# Patient Record
Sex: MALE | Race: WHITE | NOT HISPANIC OR LATINO | Employment: FULL TIME | URBAN - METROPOLITAN AREA
[De-identification: names, ages, dates, MRNs, and addresses within clinical notes are randomized per-mention and may not be internally consistent; named-entity substitution may affect disease eponyms.]

---

## 2019-02-21 ENCOUNTER — OFFICE VISIT (OUTPATIENT)
Dept: FAMILY MEDICINE | Facility: CLINIC | Age: 24
End: 2019-02-21
Payer: COMMERCIAL

## 2019-02-21 VITALS
HEIGHT: 70 IN | BODY MASS INDEX: 20.62 KG/M2 | DIASTOLIC BLOOD PRESSURE: 60 MMHG | HEART RATE: 78 BPM | WEIGHT: 144 LBS | OXYGEN SATURATION: 98 % | SYSTOLIC BLOOD PRESSURE: 110 MMHG

## 2019-02-21 DIAGNOSIS — R63.0 DECREASED APPETITE: Primary | ICD-10-CM

## 2019-02-21 DIAGNOSIS — Z00.00 HEALTHCARE MAINTENANCE: ICD-10-CM

## 2019-02-21 DIAGNOSIS — R63.4 WEIGHT LOSS: ICD-10-CM

## 2019-02-21 PROBLEM — Z78.9 NON-SMOKER: Status: ACTIVE | Noted: 2019-02-21

## 2019-02-21 PROBLEM — G25.3 MYOCLONUS: Status: ACTIVE | Noted: 2019-02-21

## 2019-02-21 PROBLEM — G40.309 GENERALIZED CONVULSIVE EPILEPSY: Status: ACTIVE | Noted: 2019-02-21

## 2019-02-21 PROBLEM — F95.2 GILLES DE LA TOURETTE'S SYNDROME: Status: ACTIVE | Noted: 2019-02-21

## 2019-02-21 PROCEDURE — 99214 OFFICE O/P EST MOD 30 MIN: CPT | Mod: ,,, | Performed by: NURSE PRACTITIONER

## 2019-02-21 PROCEDURE — 99214 PR OFFICE/OUTPT VISIT, EST, LEVL IV, 30-39 MIN: ICD-10-PCS | Mod: ,,, | Performed by: NURSE PRACTITIONER

## 2019-02-21 PROCEDURE — 3008F BODY MASS INDEX DOCD: CPT | Mod: ,,, | Performed by: NURSE PRACTITIONER

## 2019-02-21 PROCEDURE — 3008F PR BODY MASS INDEX (BMI) DOCUMENTED: ICD-10-PCS | Mod: ,,, | Performed by: NURSE PRACTITIONER

## 2019-02-21 RX ORDER — LAMOTRIGINE 200 MG/1
1 TABLET ORAL 2 TIMES DAILY
COMMUNITY

## 2019-02-21 RX ORDER — CYPROHEPTADINE HYDROCHLORIDE 4 MG/1
4 TABLET ORAL 2 TIMES DAILY PRN
Qty: 60 TABLET | Refills: 2 | Status: SHIPPED | OUTPATIENT
Start: 2019-02-21 | End: 2019-05-02

## 2019-02-21 NOTE — PATIENT INSTRUCTIONS
"  Understanding Body Mass Index (BMI)  Body mass index (BMI) is a method of screening for a weight category using the ratio of your height to your weight. The BMI is a measure of overweight that is corrected for height. Knowing your BMI is a way to tell if you are at a healthy weight. The higher your BMI, the greater your risk for weight-related health problems.  What BMI means  · BMI below 18.5: Underweight  · BMI 18.5 to 24.9: Healthy weight or "ideal body weight"   · BMI 25 to 29.9: Overweight  · BMI 30 and over: Obese  · BMI 40 and over: Severe obesity   Online BMI Calculators  Find your BMI with an online BMI calculator tool, such as these from the CDC:  · BMI calculator for adults  · BMI calculator for children and teens   Using the BMI chart  To figure out your BMI, find your height and weight (or the numbers closest to them) on the table below. Follow each column of numbers to where your height and weight meet on the table. That is your BMI.    Date Last Reviewed: 7/1/2016  © 6579-4875 [employer]. [address] [place] [address], [place] [address]. All rights reserved. This information is not intended as a substitute for professional medical care. Always follow your healthcare professional's instructions.        "

## 2019-02-21 NOTE — PROGRESS NOTES
SUBJECTIVE:      Patient ID: Sky Barrientos is a 23 y.o. male.    Chief Complaint: Weight Loss    Sky is here today with c/o of about a 20 lb weight loss over many months.  He states that he feels fine, he just does not have an appetite.  He states he usually only eats twice a day.  He usually does not eat breakfast and does not snack between meals. He has lost 18 lbs since 2016.  His weight is overall stable since 2012.        History reviewed. No pertinent surgical history.  Family History   Problem Relation Age of Onset    Heart disease Father       Social History     Socioeconomic History    Marital status: Single     Spouse name: None    Number of children: None    Years of education: None    Highest education level: None   Social Needs    Financial resource strain: None    Food insecurity - worry: None    Food insecurity - inability: None    Transportation needs - medical: None    Transportation needs - non-medical: None   Occupational History    Occupation: [employer]    Tobacco Use    Smoking status: Never Smoker    Smokeless tobacco: Never Used   Substance and Sexual Activity    Alcohol use: No    Drug use: No    Sexual activity: Yes     Partners: Female   Other Topics Concern    None   Social History Narrative    None     Current Outpatient Medications   Medication Sig Dispense Refill    lamoTRIgine (LAMICTAL) 200 MG tablet Take 1 tablet by mouth 2 (two) times daily.      cyproheptadine (PERIACTIN) 4 mg tablet Take 1 tablet (4 mg total) by mouth 2 (two) times daily as needed. 60 tablet 2     No current facility-administered medications for this visit.      Review of patient's allergies indicates:  No Known Allergies   History reviewed. No pertinent past medical history.  History reviewed. No pertinent surgical history.    Review of Systems   Constitutional: Negative for activity change, appetite change, chills, diaphoresis, fatigue, fever and unexpected weight change.   HENT:  "Negative for congestion, nosebleeds, postnasal drip, rhinorrhea, sore throat and voice change.    Eyes: Negative for pain, discharge and visual disturbance.   Respiratory: Negative for apnea, cough, shortness of breath and wheezing.    Cardiovascular: Negative for chest pain, palpitations and leg swelling.   Gastrointestinal: Negative for abdominal pain, constipation, diarrhea, nausea and vomiting.   Endocrine: Negative for polydipsia, polyphagia and polyuria.   Genitourinary: Negative for difficulty urinating, dysuria, frequency, testicular pain and urgency.   Musculoskeletal: Negative for arthralgias, gait problem, myalgias and neck pain.   Skin: Negative for color change, pallor and rash.   Allergic/Immunologic: Negative for immunocompromised state.   Neurological: Negative for dizziness, syncope, weakness, numbness and headaches.   Hematological: Negative for adenopathy. Does not bruise/bleed easily.   Psychiatric/Behavioral: Negative for confusion, dysphoric mood, self-injury, sleep disturbance and suicidal ideas. The patient is not nervous/anxious.       OBJECTIVE:      Vitals:    02/21/19 1354   BP: 110/60   Pulse: 78   SpO2: 98%   Weight: 65.3 kg (144 lb)   Height: 5' 10" (1.778 m)     Physical Exam   Constitutional: He is oriented to person, place, and time. He appears well-developed and well-nourished. No distress.   HENT:   Head: Normocephalic and atraumatic.   Right Ear: Tympanic membrane, external ear and ear canal normal.   Left Ear: Tympanic membrane, external ear and ear canal normal.   Nose: Nose normal.   Mouth/Throat: Uvula is midline and oropharynx is clear and moist. No oropharyngeal exudate, posterior oropharyngeal edema or posterior oropharyngeal erythema.   Eyes: Conjunctivae, EOM and lids are normal. Pupils are equal, round, and reactive to light. Right eye exhibits no discharge. Left eye exhibits no discharge. No scleral icterus.   Neck: Normal range of motion. Neck supple. Carotid bruit is " not present. No tracheal deviation present. No thyromegaly present.   Cardiovascular: Normal rate, regular rhythm, normal heart sounds and intact distal pulses. Exam reveals no gallop and no friction rub.   No murmur heard.  Pulmonary/Chest: Effort normal and breath sounds normal. No stridor. No respiratory distress. He has no wheezes. He has no rales.   Abdominal: Soft. Bowel sounds are normal. He exhibits no distension and no mass. There is no hepatosplenomegaly. There is no tenderness. There is no rigidity, no rebound, no guarding and no CVA tenderness.   Musculoskeletal: Normal range of motion. He exhibits no edema, tenderness or deformity.   Lymphadenopathy:        Head (right side): No submental, no submandibular, no posterior auricular and no occipital adenopathy present.        Head (left side): No submental, no submandibular, no posterior auricular and no occipital adenopathy present.     He has no cervical adenopathy.        Right cervical: No superficial cervical adenopathy present.       Left cervical: No superficial cervical adenopathy present.        Right: No inguinal and no supraclavicular adenopathy present.        Left: No inguinal and no supraclavicular adenopathy present.   Neurological: He is alert and oriented to person, place, and time.   Skin: Skin is warm, dry and intact. Capillary refill takes less than 2 seconds. No rash noted. He is not diaphoretic. No erythema. No pallor.   Psychiatric: He has a normal mood and affect. His behavior is normal. Judgment and thought content normal. He expresses no suicidal plans.   Vitals reviewed.     Assessment:       1. Decreased appetite    2. Weight loss    3. Healthcare maintenance        Plan:       Decreased appetite   Side effects of new medication discussed with patient; understanding voiced.  -     cyproheptadine (PERIACTIN) 4 mg tablet; Take 1 tablet (4 mg total) by mouth 2 (two) times daily as needed.  Dispense: 60 tablet; Refill: 2    Weight  loss   Discussion with patient about normal weight and the fact that his BMI is normal.  He would still like to try to gain some weight.    Healthcare maintenance  -     CBC auto differential; Future; Expected date: 02/21/2019  -     Comprehensive metabolic panel; Future; Expected date: 02/21/2019  -     Lipid panel; Future; Expected date: 02/21/2019  -     TSH; Future; Expected date: 02/21/2019   Refused Tdap and flu vaccines today      Follow-up in about 3 months (around 5/21/2019) for weight check.      2/21/2019 Kelly López, KATHERINE, FNP

## 2019-05-02 ENCOUNTER — OFFICE VISIT (OUTPATIENT)
Dept: FAMILY MEDICINE | Facility: CLINIC | Age: 24
End: 2019-05-02
Payer: COMMERCIAL

## 2019-05-02 VITALS
HEART RATE: 79 BPM | SYSTOLIC BLOOD PRESSURE: 134 MMHG | BODY MASS INDEX: 22.76 KG/M2 | WEIGHT: 159 LBS | DIASTOLIC BLOOD PRESSURE: 74 MMHG | HEIGHT: 70 IN

## 2019-05-02 DIAGNOSIS — B02.9 HERPES ZOSTER WITHOUT COMPLICATION: Primary | ICD-10-CM

## 2019-05-02 PROCEDURE — 99213 OFFICE O/P EST LOW 20 MIN: CPT | Mod: ,,, | Performed by: INTERNAL MEDICINE

## 2019-05-02 PROCEDURE — 99213 PR OFFICE/OUTPT VISIT, EST, LEVL III, 20-29 MIN: ICD-10-PCS | Mod: ,,, | Performed by: INTERNAL MEDICINE

## 2019-05-02 PROCEDURE — 3008F PR BODY MASS INDEX (BMI) DOCUMENTED: ICD-10-PCS | Mod: ,,, | Performed by: INTERNAL MEDICINE

## 2019-05-02 PROCEDURE — 3008F BODY MASS INDEX DOCD: CPT | Mod: ,,, | Performed by: INTERNAL MEDICINE

## 2019-05-02 RX ORDER — VALACYCLOVIR HYDROCHLORIDE 1 G/1
1000 TABLET, FILM COATED ORAL 3 TIMES DAILY
Qty: 21 TABLET | Refills: 0 | Status: SHIPPED | OUTPATIENT
Start: 2019-05-02 | End: 2019-06-17

## 2019-05-02 RX ORDER — GABAPENTIN 100 MG/1
CAPSULE ORAL
Qty: 30 CAPSULE | Refills: 1 | Status: SHIPPED | OUTPATIENT
Start: 2019-05-02 | End: 2019-06-17

## 2019-05-02 NOTE — PATIENT INSTRUCTIONS
Shingles  Shingles is a viral infection caused by the same virus as chicken pox. Anyone who has had chicken pox may get shingles later in life. The virus stays in the body, but remains dormant (asleep). Shingles often occurs in older persons or persons with lowered immunity. But it can affect anyone at any age.  Shingles starts as a tingling patch of skin on one side of the body. Small, painful blisters may then appear. The rash does not spread to the rest of the body.  Exposure to shingles cannot cause shingles. However, it can cause chicken pox in anyone who has not had chicken pox or has not been vaccinated. The contagious period ends when all blisters have crusted over (generally about 2 weeks after the illness begins).  After the blisters heal, the affected skin may be sensitive or painful for months (neuralgia). This often gradually goes away.  A shingles vaccine is available. This can help prevent shingles or make it less painful. It is generally recommended for adults over the age of 60 who have had chicken pox in the past, but who have never had shingles. Adults over 60 who have had neither chicken pox nor shingles can prevent both diseases with the chicken pox vaccine. Ask your healthcare provider about these vaccines.  Home care  · Medicines may be prescribed to help relieve pain. Take these medicines as directed. Ask your healthcare provider or pharmacist before using over-the-counter medicines for helping treat pain and itching.  · In certain cases, antiviral medicines may be prescribed to reduce pain, shorten the illness, and prevent neuralgia. Take these medicines as directed.  · Compresses made from a solution of cool water mixed with cornstarch or baking soda may help relieve pain and itching.   · Gently wash skin daily with soap and water to help prevent infection.  Be certain to rinse off all of the soap, which can be irritating.  · Trim fingernails and try not to scratch. Scratching the sores  may leave scars.  · Stay home from work or school until all blisters have formed a crust and you are no longer contagious.  Follow-up care  Follow up with your healthcare provider or as directed by our staff.  When to seek medical advice  · Fever of 100.4°F (38°C) or higher, or as directed by your healthcare provider  · Affected skin is on the face or neck and any of the following occur:  ¨ Headache  ¨ Eye pain  ¨ Changes in vision  ¨ Sores near the eye  ¨ Weakness of facial muscles  · Pain, redness, or swelling of a joint  · Signs of skin infection: colored drainage from the sores, warmth, increasing redness, or increasing pain  Date Last Reviewed: 9/25/2015  © 4338-2382 [employer]. [address] [place] [address], [place] [address]. All rights reserved. This information is not intended as a substitute for professional medical care. Always follow your healthcare professional's instructions.

## 2019-05-02 NOTE — PROGRESS NOTES
Subjective:       Patient ID: Sky Barrientos is a 23 y.o. male.    Chief Complaint: Rash (chest and back )    Mr Barrientos is a 23-year-old  Male Who Comes for same-Day Visit Appointment. He Started the Insulin Rash in the Anterior Chest Wall and Posterior Chest Wall since the Last. 4 days,. This seems to have started as a tingle and itcj and he thought it was insect bite. He applied an antibiotic cream with no relief. He c/o of light pounding sensation. He had chicken pox during child bliss.    Rash   This is a new problem. Episode onset: 4. The problem has been gradually worsening since onset. The affected locations include the back. The rash is characterized by itchiness and blistering. Pertinent negatives include no congestion, fatigue or fever. Past treatments include nothing. The treatment provided no relief. There is no history of varicella.       History reviewed. No pertinent past medical history.  Social History     Socioeconomic History    Marital status: Single     Spouse name: Not on file    Number of children: Not on file    Years of education: Not on file    Highest education level: Not on file   Occupational History    Occupation: [employer]    Social Needs    Financial resource strain: Not on file    Food insecurity:     Worry: Not on file     Inability: Not on file    Transportation needs:     Medical: Not on file     Non-medical: Not on file   Tobacco Use    Smoking status: Never Smoker    Smokeless tobacco: Never Used   Substance and Sexual Activity    Alcohol use: No    Drug use: No    Sexual activity: Yes     Partners: Female   Lifestyle    Physical activity:     Days per week: Not on file     Minutes per session: Not on file    Stress: Not on file   Relationships    Social connections:     Talks on phone: Not on file     Gets together: Not on file     Attends Muslim service: Not on file     Active member of club or organization: Not on file     Attends meetings of clubs  "or organizations: Not on file     Relationship status: Not on file   Other Topics Concern    Not on file   Social History Narrative    Not on file     History reviewed. No pertinent surgical history.  Family History   Problem Relation Age of Onset    Heart disease Father        Review of Systems   Constitutional: Positive for activity change. Negative for chills, diaphoresis, fatigue and fever.   HENT: Negative for congestion, drooling and mouth sores.    Eyes: Negative.    Respiratory: Negative.  Negative for apnea and chest tightness.    Cardiovascular: Negative.  Negative for chest pain and leg swelling.   Endocrine: Negative.    Musculoskeletal: Negative for back pain and gait problem.   Skin: Positive for rash.         Objective:      Blood pressure 134/74, pulse 79, height 5' 10" (1.778 m), weight 72.1 kg (159 lb). Body mass index is 22.81 kg/m².  Physical Exam   Constitutional: He appears well-developed and well-nourished. No distress.   Eyes: Right eye exhibits no discharge. Left eye exhibits no discharge.   Neck: Normal range of motion. No tracheal deviation present. No thyromegaly present.   Cardiovascular: Normal rate and regular rhythm.   Pulmonary/Chest: Effort normal and breath sounds normal.   Abdominal: Soft. Bowel sounds are normal.   Neurological: He is alert.   Skin: Rash noted. He is not diaphoretic.                     Assessment:       1. Herpes zoster without complication                 Plan:           Herpes zoster without complication  -     valACYclovir (VALTREX) 1000 MG tablet; Take 1 tablet (1,000 mg total) by mouth 3 (three) times daily.  Dispense: 21 tablet; Refill: 0  -     gabapentin (NEURONTIN) 100 MG capsule; 1 ap in noon and 2 in PM  Dispense: 30 capsule; Refill: 1      Cautions given Gabapentin trial.    I discussed with pt natural course and expected complications of shingles.Incl Post herpetic neuralgia        Fup 1 week Kelly Lopez      Current Outpatient Medications:    "  lamoTRIgine (LAMICTAL) 200 MG tablet, Take 1 tablet by mouth 2 (two) times daily., Disp: , Rfl:     gabapentin (NEURONTIN) 100 MG capsule, 1 ap in noon and 2 in PM, Disp: 30 capsule, Rfl: 1    valACYclovir (VALTREX) 1000 MG tablet, Take 1 tablet (1,000 mg total) by mouth 3 (three) times daily., Disp: 21 tablet, Rfl: 0

## 2019-06-17 ENCOUNTER — OFFICE VISIT (OUTPATIENT)
Dept: FAMILY MEDICINE | Facility: CLINIC | Age: 24
End: 2019-06-17
Payer: COMMERCIAL

## 2019-06-17 VITALS
SYSTOLIC BLOOD PRESSURE: 128 MMHG | DIASTOLIC BLOOD PRESSURE: 53 MMHG | HEIGHT: 70 IN | HEART RATE: 106 BPM | BODY MASS INDEX: 23.34 KG/M2 | WEIGHT: 163 LBS

## 2019-06-17 DIAGNOSIS — R04.0 LEFT-SIDED EPISTAXIS: Primary | ICD-10-CM

## 2019-06-17 DIAGNOSIS — G40.409: ICD-10-CM

## 2019-06-17 DIAGNOSIS — J30.1 NON-SEASONAL ALLERGIC RHINITIS DUE TO POLLEN: ICD-10-CM

## 2019-06-17 PROCEDURE — 3008F BODY MASS INDEX DOCD: CPT | Mod: ,,, | Performed by: INTERNAL MEDICINE

## 2019-06-17 PROCEDURE — 99213 OFFICE O/P EST LOW 20 MIN: CPT | Mod: ,,, | Performed by: INTERNAL MEDICINE

## 2019-06-17 PROCEDURE — 3008F PR BODY MASS INDEX (BMI) DOCUMENTED: ICD-10-PCS | Mod: ,,, | Performed by: INTERNAL MEDICINE

## 2019-06-17 PROCEDURE — 99213 PR OFFICE/OUTPT VISIT, EST, LEVL III, 20-29 MIN: ICD-10-PCS | Mod: ,,, | Performed by: INTERNAL MEDICINE

## 2019-06-17 RX ORDER — FLUTICASONE PROPIONATE 50 MCG
1 SPRAY, SUSPENSION (ML) NASAL DAILY
Qty: 1 BOTTLE | Refills: 5 | Status: SHIPPED | OUTPATIENT
Start: 2019-06-17 | End: 2022-09-28

## 2019-06-17 NOTE — PROGRESS NOTES
Subjective:       Patient ID: Sky Barrientos is a 23 y.o. male.    Chief Complaint: Epistaxis (x 3 days )    Mr. Sky Barrientos is a 23-year-old  male who actually comes for recent epistaxis that he has FELT mostly from the left nose. During his teenage years he used to have a lot of epistaxis probably bilaterally.    He does not require recall any trauma or injury. He is not on any antiplatelet medications. Past ENT evaluation results are unknown.    During the examination process he felt somewhat queasy and dizzy and had a tonic episode of seizure without loss. This lasted for approximately 15-20 seconds with immediate and rapid recovery back to full normalcy.    Epistaxis    The bleeding has been from the left nare. This is a new problem. The current episode started in the past 7 days. He has experienced no nasal trauma. The problem has been rapidly improving. The bleeding is associated with dry air. He has tried nothing for the symptoms. The treatment provided moderate relief. His past medical history is significant for frequent nosebleeds (several years ago).   Seizures    This is a recurrent (in office) problem. The problem has been rapidly improving. There was 1 seizure. Pertinent negatives include no sleepiness (dizziness). Characteristics include eye deviation and loss of consciousness. Characteristics do not include bit tongue or apnea. lasted 5-10 secs The seizure(s) had no focality. There has been no fever.       History reviewed. No pertinent past medical history.  Social History     Socioeconomic History    Marital status: Significant Other     Spouse name: Not on file    Number of children: Not on file    Years of education: Not on file    Highest education level: Not on file   Occupational History    Occupation: [employer]    Social Needs    Financial resource strain: Not on file    Food insecurity:     Worry: Not on file     Inability: Not on file    Transportation  "needs:     Medical: Not on file     Non-medical: Not on file   Tobacco Use    Smoking status: Never Smoker    Smokeless tobacco: Never Used   Substance and Sexual Activity    Alcohol use: No    Drug use: No    Sexual activity: Yes     Partners: Female   Lifestyle    Physical activity:     Days per week: Not on file     Minutes per session: Not on file    Stress: Not on file   Relationships    Social connections:     Talks on phone: Not on file     Gets together: Not on file     Attends Orthodox service: Not on file     Active member of club or organization: Not on file     Attends meetings of clubs or organizations: Not on file     Relationship status: Not on file   Other Topics Concern    Not on file   Social History Narrative    Not on file     History reviewed. No pertinent surgical history.  Family History   Problem Relation Age of Onset    Heart disease Father        Review of Systems   Constitutional: Negative.    HENT: Positive for congestion and nosebleeds. Negative for sinus pressure, sinus pain and sneezing.    Eyes: Negative.    Respiratory: Negative.  Negative for apnea.    Gastrointestinal: Negative.    Neurological: Positive for seizures and loss of consciousness.         Objective:      Blood pressure (!) 128/53, pulse 106, height 5' 10" (1.778 m), weight 73.9 kg (163 lb). Body mass index is 23.39 kg/m².  Physical Exam   Constitutional: He appears well-developed and well-nourished. No distress.   HENT:   Nose: Mucosal edema present. No septal deviation or nasal septal hematoma. No epistaxis.   Eyes: Right eye exhibits no discharge. Left eye exhibits no discharge.   Neck: Normal range of motion. Neck supple. No tracheal deviation present. No thyromegaly present.   Cardiovascular: Normal rate.   Pulmonary/Chest: Effort normal and breath sounds normal.         Assessment:       1. Left-sided epistaxis    2. Non-seasonal allergic rhinitis due to pollen    3. Tonic epilepsy       "           Plan:           Left-sided epistaxis  -     fluticasone propionate (FLONASE) 50 mcg/actuation nasal spray; 1 spray (50 mcg total) by Each Nare route once daily.  Dispense: 1 Bottle; Refill: 5    Non-seasonal allergic rhinitis due to pollen  -     fluticasone propionate (FLONASE) 50 mcg/actuation nasal spray; 1 spray (50 mcg total) by Each Nare route once daily.  Dispense: 1 Bottle; Refill: 5    Tonic epilepsy    Patient has been advised that the most common cause of bleeding might be perhaps allergies and nasal congestion. Patient has been advised to try nasal compression or ice chip or Afrin nasal spray in case he bleeds again. This will stop the bleeding in about 90% of the cases.    I will give a trial of Flonase 2 sprays in each nostril assuming that there is a lot of allergy in his nasal congestion.    If he can do some saltwater gargles and steam inhalation for cleansing  his nostrils that'll be good.    While patient was being observed in the office he was noticed to have a Tonic episode of epilepsy.    This was preceded by symptoms of unusual feeling and dizziness and when he was sitting in the chair his eyes rolled up and again went into a tonic phase of spasm with his head backed up and eyes rolled up towards the right direction.    He slumped on the ground and I eased his fall on the ground. He measured it again consciousness within 20 or 30 seconds and he was more or less back to normal.    Blood sugar checked at that time after the episode was approximately 99.    He was given a granola bar which seemed to have poked him up.      I've advised him to contact his neurologist for a follow-up concerning the same.    I've advised him not to remain on empty stomach ever from here onwards.      He should contact his neurologist for further instructions and I'll check a Lamictal level also.      I sent a message to his neurologist also.      Current Outpatient Medications:     lamoTRIgine (LAMICTAL)  200 MG tablet, Take 1 tablet by mouth 2 (two) times daily., Disp: , Rfl:     fluticasone propionate (FLONASE) 50 mcg/actuation nasal spray, 1 spray (50 mcg total) by Each Nare route once daily., Disp: 1 Bottle, Rfl: 5

## 2019-06-17 NOTE — LETTER
June 17, 2019        Faustino Adams MD  [id number] [address] [address]             [facility]  [address] [place] [id number]  Phone: [phone]  Fax: [phone]   Patient: Sky Barrientos   MR Number: [id number]   YOB: 1995   Date of Visit: 6/17/2019     Dear Dr. Adams,     I had seen Mr. Sky Barrientos for some unrelated reasons of epistaxis in my office. He was noted to have a tonic episode of seizure lasting for approximately 15-20 seconds with near immediate recovery of consciousness. This was preceded by an aura or feeling of dizziness for about 5-10 seconds. The entire episode of chronicity lasted for 15-20 seconds with brief loss of consciousness and rapid recovery within another 10-15 seconds. No tongue bite or urinary incontinence. Blood sugar checked at that time was 99. He stated that he had not eaten food and felt somewhat uncomfortable.    Sincerely,      Jose Kang MD            CC  No Recipients    Enclosure

## 2019-06-17 NOTE — PATIENT INSTRUCTIONS
Allergic Rhinitis  Allergic rhinitis is an allergic reaction that affects the nose, and often the eyes. Its often known as nasal allergies. Nasal allergies are often due to things in the environment that are breathed in. Depending what you are sensitive to, nasal allergies may occur only during certain seasons. Or they may occur year round. Common indoor allergens include house dust mites, mold, cockroaches, and pet dander. Outdoor allergens include pollen from trees, grasses, and weeds.   Symptoms include a drippy, stuffy, and itchy nose. They also include sneezing and red and itchy eyes. You may feel tired more often. Severe allergies may also affect your breathing and trigger a condition called asthma.   Tests can be done to see what allergens are affecting you. You may be referred to an allergy specialist for testing and further evaluation.  Home care  Your healthcare provider may prescribe medicines to help relieve allergy symptoms. These may include oral medicines, nasal sprays, or eye drops.  Ask your provider for advice on how to avoid substances that you are allergic to. Below are a few tips for each type of allergen.  Pet dander:  · Do not have pets with fur and feathers.  · If you can't avoid having a pet, keep it out of your bedroom and off upholstered furniture.  Pollen:  · When pollen counts are high, keep windows of your car and home closed. If possible, use an air conditioner instead.  · Wear a filter mask when mowing or doing yard work.  House dust mites:  · Wash bedding every week in warm water and detergent and dry on a hot setting.  · Cover the mattress, box spring, and pillows with allergy covers.   · If possible, sleep in a room with no carpet, curtains, or upholstered furniture.  Cockroaches:  · Store food in sealed containers.  · Remove garbage from the home promptly.  · Fix water leaks  Mold:  · Keep humidity low by using a dehumidifier or air conditioner. Keep the dehumidifier and air  conditioner clean and free of mold.  · Clean moldy areas with bleach and water.  In general:  · Vacuum once or twice a week. If possible, use a vacuum with a high-efficiency particulate air (HEPA) filter.  · Do not smoke. Avoid cigarette smoke. Cigarette smoke is an irritant that can make symptoms worse.  Follow-up care  Follow up as advised by the healthcare provider or our staff. If you were referred to an allergy specialist, make this appointment promptly.  When to seek medical advice  Call your healthcare provider right away if the following occur:  · Coughing or wheezing  · Fever greater than 100.4°F (38°C)  · Hives (raised red bumps)  · Continuing symptoms, new symptoms, or worsening symptoms  Call 911 right away if you have:  · Trouble breathing  · Severe swelling of the face or severe itching of the eyes or mouth  Date Last Reviewed: 3/1/2017  © 8586-5677 [employer]. [address] [place] [address]. All rights reserved. This information is not intended as a substitute for professional medical care. Always follow your healthcare professional's instructions.        Nosebleed (Adult)    Bleeding from the nose most commonly occurs because of injury or drying and cracking of the inner lining of the nose. Most nosebleeds are because of dry air or nose-picking. They can occur during a common cold or an allergy attack. They can also occur on a very hot day, or from dry air in the winter.  If the bleeding site is found, it may be cauterized. This means it is treated to cause a blood clot to form. This may be done with a chemical, heat, or electricity. If the bleeding continues after the site is cauterized, or if the site cannot be found, packing may be put in your nose. This is to apply pressure and stop the bleeding. The packing may be made of gauze or sponge. A small balloon catheter is sometimes used. These must be removed by your doctor. Some types of packing dissolve on their own.  Home  care  · If packing was put in your nose, unless told otherwise, do not pull on it or try to remove it yourself. You will be given an appointment to have it removed. You may also have been given antibiotics to prevent a sinus infection. If so, finish all of the medicine.  · Do not blow your nose for 12 hours after the bleeding stops. This will allow a strong blood clot to form. Do not pick your nose. This may restart bleeding.  · Avoid drinking alcohol and hot liquids for the next 2 days. Alcohol or hot liquids in your mouth can dilate blood vessels in your nose. This can cause bleeding to start again.  · Do not take ibuprofen, naproxen, or medicines that contain aspirin. These thin the blood and may cause your nose to bleed. You may take acetaminophen for pain, unless another pain medicine was prescribed.  · If the bleeding starts again, sit up and lean forward to prevent swallowing blood. Pinch your nose tightly on both sides, as shown above, for 10 to 15 minutes. Time yourself. Dont release the pressure on your nose until 10 minutes is up. If bleeding does not stop, continue to pinch your nose and call your healthcare provider or return to this facility.  · If you have a cold, allergies, or dry nasal membranes, lubricate the nasal passages. Apply a small amount of petroleum jelly inside the nose with a cotton swab twice a day (morning and night).  · Avoid overheating your home. This can dry the air and make your condition worse.  · Put a humidifier in the room where you sleep. This will add moisture to the air.  · Use a saline nasal spray to keep nasal passages moist.  · Do not pick your nose. Keep fingernails trimmed to decrease risk of bleeds.  · Do not smoke.  Follow-up care  Follow up with your healthcare provider, or as advised. Nasal packing should be rechecked or removed within 2 to 3 days.  When to seek medical advice  Call your healthcare provider right away if any of these occur.  · You have another  nosebleed that you cannot control  · Dizziness, weakness, or fainting  · You become tired or confused  · Fever of 100.4ºF (38ºC) or higher, or as directed by your healthcare provider  · Headache  · Sinus or facial pain  · Shortness of breath or trouble breathing  Date Last Reviewed: 3/22/2015  © 9823-4737 [employer]. [address] [place] [address]. All rights reserved. This information is not intended as a substitute for professional medical care. Always follow your healthcare professional's instructions.        Nasal Packing, Dissolving  A nasal packing was placed in the front part of your nose (anterior) to control bleeding. This packing is made of a substance that helps your blood clot. It will soften and mix with the clot. It will melt, be absorbed, or fall out after a few days.  Home care  Follow these guidelines when caring for yourself at home:  · Do not pull on the packing or try to remove it yourself during the first 3 days, unless your health care provider tells you to.  · Do not drink alcohol or hot liquids while the packing is in place. These can dilate blood vessels in your nose and cause bleeding to start again.  · Keep any appointments for packing removal. If you were asked to remove the packing at home, gently blow your nose to dislodge the pack.  · If the packing stays in place blocking the nasal passage for more than 3 days you may get a sinus infection. If you cannot breathe through that nostril after 3 days, contact your health care provider.  · Take any antibiotics that were prescribed until you have finished them.  Date Last Reviewed: 4/13/2015 © 2000-2017 [employer]. [address] [place] [address]. All rights reserved. This information is not intended as a substitute for professional medical care. Always follow your healthcare professional's instructions.        Removable Nasal Packing (Anterior)  A nasal packing was placed in your nose to  control bleeding. Most packings are left in place from 1 to 3 days. They then need to be removed by a doctor.  Home care  · Do not try to remove the packing yourself unless directed to do so by your doctor. You will be given an appointment to have it removed.  · Do not pull on the packing.  · Avoid drinking alcoholic beverages or hot liquids while the packing is in place. These can dilate blood vessels in your nose and cause bleeding to start again.  ·  Be sure to follow up with your doctor as scheduled. Nasal packing is usually removed within 3 days. Packing that is left in place longer than this may cause an infection.  · If antibiotics were prescribed, take the full course as directed. Do not stop taking them until you have finished.  Date Last Reviewed: 5/6/2015  © 4564-4886 [employer]. [address] [place] [address], [place] [address]. All rights reserved. This information is not intended as a substitute for professional medical care. Always follow your healthcare professional's instructions.

## 2021-11-10 ENCOUNTER — HOSPITAL ENCOUNTER (EMERGENCY)
Facility: HOSPITAL | Age: 26
Discharge: HOME | End: 2021-11-10
Attending: EMERGENCY MEDICINE
Payer: COMMERCIAL

## 2021-11-10 VITALS
RESPIRATION RATE: 16 BRPM | WEIGHT: 175 LBS | DIASTOLIC BLOOD PRESSURE: 82 MMHG | HEART RATE: 78 BPM | SYSTOLIC BLOOD PRESSURE: 126 MMHG | BODY MASS INDEX: 25.05 KG/M2 | HEIGHT: 70 IN | OXYGEN SATURATION: 97 % | TEMPERATURE: 97 F

## 2021-11-10 DIAGNOSIS — S61.213A LACERATION OF LEFT MIDDLE FINGER WITHOUT FOREIGN BODY WITHOUT DAMAGE TO NAIL, INITIAL ENCOUNTER: Primary | ICD-10-CM

## 2021-11-10 PROCEDURE — 99284 EMERGENCY DEPT VISIT MOD MDM: CPT | Mod: 25

## 2021-11-10 PROCEDURE — 25000003 PHARM REV CODE 250: Performed by: EMERGENCY MEDICINE

## 2021-11-10 PROCEDURE — 90715 TDAP VACCINE 7 YRS/> IM: CPT | Performed by: EMERGENCY MEDICINE

## 2021-11-10 PROCEDURE — 12001 RPR S/N/AX/GEN/TRNK 2.5CM/<: CPT

## 2021-11-10 PROCEDURE — 63600175 PHARM REV CODE 636 W HCPCS: Performed by: EMERGENCY MEDICINE

## 2021-11-10 PROCEDURE — 90471 IMMUNIZATION ADMIN: CPT | Performed by: EMERGENCY MEDICINE

## 2021-11-10 RX ORDER — LIDOCAINE HYDROCHLORIDE 10 MG/ML
10 INJECTION INFILTRATION; PERINEURAL
Status: COMPLETED | OUTPATIENT
Start: 2021-11-10 | End: 2021-11-10

## 2021-11-10 RX ORDER — BACITRACIN ZINC 500 UNIT/G
1 OINTMENT (GRAM) TOPICAL
Status: COMPLETED | OUTPATIENT
Start: 2021-11-10 | End: 2021-11-10

## 2021-11-10 RX ADMIN — LIDOCAINE HYDROCHLORIDE 10 ML: 10 INJECTION, SOLUTION INFILTRATION; PERINEURAL at 09:11

## 2021-11-10 RX ADMIN — BACITRACIN ZINC 1 TUBE: 500 OINTMENT TOPICAL at 09:11

## 2021-11-10 RX ADMIN — TETANUS TOXOID, REDUCED DIPHTHERIA TOXOID AND ACELLULAR PERTUSSIS VACCINE, ADSORBED 0.5 ML: 5; 2.5; 8; 8; 2.5 SUSPENSION INTRAMUSCULAR at 09:11

## 2022-09-28 ENCOUNTER — OFFICE VISIT (OUTPATIENT)
Dept: FAMILY MEDICINE | Facility: CLINIC | Age: 27
End: 2022-09-28
Payer: COMMERCIAL

## 2022-09-28 VITALS
OXYGEN SATURATION: 98 % | WEIGHT: 181.13 LBS | RESPIRATION RATE: 20 BRPM | DIASTOLIC BLOOD PRESSURE: 72 MMHG | HEART RATE: 91 BPM | TEMPERATURE: 98 F | BODY MASS INDEX: 25.93 KG/M2 | SYSTOLIC BLOOD PRESSURE: 130 MMHG | HEIGHT: 70 IN

## 2022-09-28 DIAGNOSIS — Z00.00 ENCOUNTER FOR ROUTINE ADULT HEALTH EXAMINATION WITHOUT ABNORMAL FINDINGS: Primary | ICD-10-CM

## 2022-09-28 PROCEDURE — 1160F RVW MEDS BY RX/DR IN RCRD: CPT | Mod: CPTII,S$GLB,, | Performed by: NURSE PRACTITIONER

## 2022-09-28 PROCEDURE — 3008F PR BODY MASS INDEX (BMI) DOCUMENTED: ICD-10-PCS | Mod: CPTII,S$GLB,, | Performed by: NURSE PRACTITIONER

## 2022-09-28 PROCEDURE — 3078F PR MOST RECENT DIASTOLIC BLOOD PRESSURE < 80 MM HG: ICD-10-PCS | Mod: CPTII,S$GLB,, | Performed by: NURSE PRACTITIONER

## 2022-09-28 PROCEDURE — 3008F BODY MASS INDEX DOCD: CPT | Mod: CPTII,S$GLB,, | Performed by: NURSE PRACTITIONER

## 2022-09-28 PROCEDURE — 1160F PR REVIEW ALL MEDS BY PRESCRIBER/CLIN PHARMACIST DOCUMENTED: ICD-10-PCS | Mod: CPTII,S$GLB,, | Performed by: NURSE PRACTITIONER

## 2022-09-28 PROCEDURE — 99395 PR PREVENTIVE VISIT,EST,18-39: ICD-10-PCS | Mod: S$GLB,,, | Performed by: NURSE PRACTITIONER

## 2022-09-28 PROCEDURE — 1159F MED LIST DOCD IN RCRD: CPT | Mod: CPTII,S$GLB,, | Performed by: NURSE PRACTITIONER

## 2022-09-28 PROCEDURE — 3075F SYST BP GE 130 - 139MM HG: CPT | Mod: CPTII,S$GLB,, | Performed by: NURSE PRACTITIONER

## 2022-09-28 PROCEDURE — 3078F DIAST BP <80 MM HG: CPT | Mod: CPTII,S$GLB,, | Performed by: NURSE PRACTITIONER

## 2022-09-28 PROCEDURE — 99395 PREV VISIT EST AGE 18-39: CPT | Mod: S$GLB,,, | Performed by: NURSE PRACTITIONER

## 2022-09-28 PROCEDURE — 3075F PR MOST RECENT SYSTOLIC BLOOD PRESS GE 130-139MM HG: ICD-10-PCS | Mod: CPTII,S$GLB,, | Performed by: NURSE PRACTITIONER

## 2022-09-28 PROCEDURE — 1159F PR MEDICATION LIST DOCUMENTED IN MEDICAL RECORD: ICD-10-PCS | Mod: CPTII,S$GLB,, | Performed by: NURSE PRACTITIONER

## 2022-09-28 NOTE — PROGRESS NOTES
SUBJECTIVE:   HPI: Sky Barrientos III  is a 26 y.o. male who presents for annual physical .   Establish Care    Sky is a new patient here to establish care with me today- here for wellness exam.  Patient has a history of seizures and is currently seeing Neurology Dr. Adams- last visit this morning.  He has been stable on his medication daily and denies seizure activity in over a year.  Initially diagnosed in 2015.  Otherwise he is healthy and does not smoke.  He does drink 3-4 beers after work daily and we discussed recommending cutting down or stopping drinking daily.  Patient is due for routine blood work at this time.  He is currently sexually active with a female partner for the last several years-there getting  next year.  He has no concerns about STD infections and no current complaints.  Recommended vaccinations reviewed with patient today.  Denies any complaints or concerns at this time.    (Not in a hospital admission)    Review of patient's allergies indicates:  No Known Allergies  Current Outpatient Medications on File Prior to Visit   Medication Sig Dispense Refill    lamoTRIgine (LAMICTAL) 200 MG tablet Take 1 tablet by mouth 2 (two) times daily.      [DISCONTINUED] fluticasone propionate (FLONASE) 50 mcg/actuation nasal spray 1 spray (50 mcg total) by Each Nare route once daily. 1 Bottle 5     No current facility-administered medications on file prior to visit.     Past Medical History:   Diagnosis Date    Non-seasonal allergic rhinitis due to pollen      History reviewed. No pertinent surgical history.  Family History   Problem Relation Age of Onset    Hyperlipidemia Mother     Heart disease Father     Diabetes Mellitus Maternal Grandmother     Breast cancer Maternal Grandmother     Skin cancer Maternal Grandmother      Social History     Tobacco Use    Smoking status: Never    Smokeless tobacco: Never   Substance Use Topics    Alcohol use: No    Drug use: No      Health Maintenance  "Topics with due status: Not Due       Topic Last Completion Date    TETANUS VACCINE 11/10/2021     Immunization History   Administered Date(s) Administered    Tdap 11/10/2021       Review of Systems   Constitutional:  Negative for activity change, appetite change, chills, fatigue, fever and unexpected weight change.   HENT:  Negative for congestion, ear pain, hearing loss, rhinorrhea, sore throat and trouble swallowing.    Eyes:  Negative for pain, discharge and visual disturbance.   Respiratory:  Negative for cough, chest tightness, shortness of breath and wheezing.    Cardiovascular:  Negative for chest pain, palpitations and leg swelling.   Gastrointestinal:  Negative for abdominal pain, blood in stool, constipation, diarrhea, nausea and vomiting.   Endocrine: Negative for cold intolerance, heat intolerance, polydipsia and polyuria.   Genitourinary:  Negative for difficulty urinating, dysuria, flank pain, hematuria and urgency.   Musculoskeletal:  Negative for arthralgias, joint swelling, myalgias and neck pain.   Skin:  Negative for rash.   Neurological:  Positive for seizures (stable on meds). Negative for dizziness, weakness and headaches.   Hematological:  Negative for adenopathy. Does not bruise/bleed easily.   Psychiatric/Behavioral:  Negative for confusion and dysphoric mood. The patient is not nervous/anxious.     OBJECTIVE:      Vitals:    09/28/22 1047   BP: 130/72   BP Location: Left arm   Patient Position: Sitting   BP Method: Large (Manual)   Pulse: 91   Resp: 20   Temp: 98.3 °F (36.8 °C)   TempSrc: Oral   SpO2: 98%   Weight: 82.1 kg (181 lb 1.6 oz)   Height: 5' 10" (1.778 m)     Physical Exam  Vitals and nursing note reviewed.   Constitutional:       General: He is not in acute distress.     Appearance: Normal appearance. He is well-developed and normal weight. He is not ill-appearing.   HENT:      Head: Normocephalic and atraumatic.      Right Ear: Tympanic membrane, ear canal and external ear " normal.      Left Ear: Tympanic membrane, ear canal and external ear normal.      Nose: Nose normal.      Mouth/Throat:      Mouth: Mucous membranes are moist.      Pharynx: Oropharynx is clear. No oropharyngeal exudate.   Eyes:      General: No scleral icterus.     Extraocular Movements: Extraocular movements intact.      Conjunctiva/sclera: Conjunctivae normal.      Pupils: Pupils are equal, round, and reactive to light.   Neck:      Thyroid: No thyroid mass or thyromegaly.      Trachea: Trachea normal.   Cardiovascular:      Rate and Rhythm: Normal rate and regular rhythm.      Heart sounds: Normal heart sounds. No murmur heard.  Pulmonary:      Effort: Pulmonary effort is normal. No respiratory distress.      Breath sounds: Normal breath sounds. No wheezing or rales.   Abdominal:      General: Bowel sounds are normal.      Palpations: Abdomen is soft.      Tenderness: There is no abdominal tenderness.   Musculoskeletal:         General: Normal range of motion.      Cervical back: Normal range of motion and neck supple.   Lymphadenopathy:      Cervical: No cervical adenopathy.   Skin:     General: Skin is warm and dry.      Coloration: Skin is not jaundiced or pale.   Neurological:      Mental Status: He is alert and oriented to person, place, and time.   Psychiatric:         Mood and Affect: Mood normal.         Behavior: Behavior normal.         Thought Content: Thought content normal.         Judgment: Judgment normal.      Assessment:       1. Encounter for routine adult health examination without abnormal findings    2. Adult BMI 25.0-25.9 kg/sq m        Plan:       Encounter for routine adult health examination without abnormal findings  -     CBC Auto Differential; Future; Expected date: 09/28/2022  -     Lipid Panel; Future; Expected date: 09/28/2022  -     TSH; Future; Expected date: 09/28/2022  -     Comprehensive Metabolic Panel; Future; Expected date: 09/28/2022    Adult BMI 25.0-25.9 kg/sq  m      Counseled on age and gender appropriate medical preventative services, including cancer screenings, immunizations, overall nutritional health, need for a consistent exercise regimen and an overall push towards maintaining a vigorous and active lifestyle.      Follow up in about 1 year (around 9/28/2023) for annual .        This note was created using [employer] voice recognition software that occasionally misinterprets phrases or words.

## 2023-08-14 ENCOUNTER — OFFICE VISIT (OUTPATIENT)
Dept: URGENT CARE | Facility: CLINIC | Age: 28
End: 2023-08-14
Payer: COMMERCIAL

## 2023-08-14 ENCOUNTER — HOSPITAL ENCOUNTER (EMERGENCY)
Facility: HOSPITAL | Age: 28
Discharge: HOME | End: 2023-08-14
Attending: EMERGENCY MEDICINE
Payer: COMMERCIAL

## 2023-08-14 VITALS
RESPIRATION RATE: 16 BRPM | SYSTOLIC BLOOD PRESSURE: 123 MMHG | BODY MASS INDEX: 27.2 KG/M2 | TEMPERATURE: 97 F | HEART RATE: 67 BPM | WEIGHT: 190 LBS | OXYGEN SATURATION: 98 % | DIASTOLIC BLOOD PRESSURE: 78 MMHG | HEIGHT: 70 IN

## 2023-08-14 VITALS
HEIGHT: 70 IN | BODY MASS INDEX: 27.2 KG/M2 | OXYGEN SATURATION: 98 % | RESPIRATION RATE: 18 BRPM | DIASTOLIC BLOOD PRESSURE: 79 MMHG | HEART RATE: 66 BPM | WEIGHT: 190 LBS | TEMPERATURE: 98 F | SYSTOLIC BLOOD PRESSURE: 123 MMHG

## 2023-08-14 DIAGNOSIS — K62.5 RECTAL BLEED: Primary | ICD-10-CM

## 2023-08-14 DIAGNOSIS — K62.5 RECTAL BLEEDING: Primary | ICD-10-CM

## 2023-08-14 LAB
ALBUMIN SERPL BCP-MCNC: 4.5 G/DL (ref 3.5–5.2)
ALP SERPL-CCNC: 68 U/L (ref 55–135)
ALT SERPL W/O P-5'-P-CCNC: 34 U/L (ref 10–44)
ANION GAP SERPL CALC-SCNC: 11 MMOL/L (ref 8–16)
AST SERPL-CCNC: 35 U/L (ref 10–40)
BASOPHILS # BLD AUTO: 0.03 K/UL (ref 0–0.2)
BASOPHILS NFR BLD: 0.6 % (ref 0–1.9)
BILIRUB SERPL-MCNC: 0.3 MG/DL (ref 0.1–1)
BUN SERPL-MCNC: 12 MG/DL (ref 6–20)
CALCIUM SERPL-MCNC: 9.2 MG/DL (ref 8.7–10.5)
CHLORIDE SERPL-SCNC: 105 MMOL/L (ref 95–110)
CO2 SERPL-SCNC: 22 MMOL/L (ref 23–29)
CREAT SERPL-MCNC: 1.4 MG/DL (ref 0.5–1.4)
DIFFERENTIAL METHOD: ABNORMAL
EOSINOPHIL # BLD AUTO: 0 K/UL (ref 0–0.5)
EOSINOPHIL NFR BLD: 0.4 % (ref 0–8)
ERYTHROCYTE [DISTWIDTH] IN BLOOD BY AUTOMATED COUNT: 11.9 % (ref 11.5–14.5)
EST. GFR  (NO RACE VARIABLE): >60 ML/MIN/1.73 M^2
GLUCOSE SERPL-MCNC: 96 MG/DL (ref 70–110)
HCT VFR BLD AUTO: 40.9 % (ref 40–54)
HGB BLD-MCNC: 14.3 G/DL (ref 14–18)
IMM GRANULOCYTES # BLD AUTO: 0.01 K/UL (ref 0–0.04)
IMM GRANULOCYTES NFR BLD AUTO: 0.2 % (ref 0–0.5)
LIPASE SERPL-CCNC: 14 U/L (ref 4–60)
LYMPHOCYTES # BLD AUTO: 1.3 K/UL (ref 1–4.8)
LYMPHOCYTES NFR BLD: 24.7 % (ref 18–48)
MCH RBC QN AUTO: 31.8 PG (ref 27–31)
MCHC RBC AUTO-ENTMCNC: 35 G/DL (ref 32–36)
MCV RBC AUTO: 91 FL (ref 82–98)
MONOCYTES # BLD AUTO: 0.4 K/UL (ref 0.3–1)
MONOCYTES NFR BLD: 7.7 % (ref 4–15)
NEUTROPHILS # BLD AUTO: 3.5 K/UL (ref 1.8–7.7)
NEUTROPHILS NFR BLD: 66.4 % (ref 38–73)
NRBC BLD-RTO: 0 /100 WBC
PLATELET # BLD AUTO: 195 K/UL (ref 150–450)
PMV BLD AUTO: 10.7 FL (ref 9.2–12.9)
POTASSIUM SERPL-SCNC: 4 MMOL/L (ref 3.5–5.1)
PROT SERPL-MCNC: 7.7 G/DL (ref 6–8.4)
RBC # BLD AUTO: 4.49 M/UL (ref 4.6–6.2)
SODIUM SERPL-SCNC: 138 MMOL/L (ref 136–145)
WBC # BLD AUTO: 5.22 K/UL (ref 3.9–12.7)

## 2023-08-14 PROCEDURE — 83690 ASSAY OF LIPASE: CPT | Performed by: EMERGENCY MEDICINE

## 2023-08-14 PROCEDURE — 36415 COLL VENOUS BLD VENIPUNCTURE: CPT | Performed by: EMERGENCY MEDICINE

## 2023-08-14 PROCEDURE — 85025 COMPLETE CBC W/AUTO DIFF WBC: CPT | Performed by: EMERGENCY MEDICINE

## 2023-08-14 PROCEDURE — 99203 OFFICE O/P NEW LOW 30 MIN: CPT | Mod: S$GLB,,,

## 2023-08-14 PROCEDURE — 80053 COMPREHEN METABOLIC PANEL: CPT | Performed by: EMERGENCY MEDICINE

## 2023-08-14 PROCEDURE — 99285 EMERGENCY DEPT VISIT HI MDM: CPT | Mod: 25

## 2023-08-14 PROCEDURE — 99203 PR OFFICE/OUTPT VISIT, NEW, LEVL III, 30-44 MIN: ICD-10-PCS | Mod: S$GLB,,,

## 2023-08-14 PROCEDURE — 25500020 PHARM REV CODE 255

## 2023-08-14 RX ADMIN — IOHEXOL 100 ML: 350 INJECTION, SOLUTION INTRAVENOUS at 11:08

## 2023-08-14 NOTE — ED PROVIDER NOTES
Encounter Date: 8/14/2023       History     Chief Complaint   Patient presents with    Rectal Bleeding     With bm this am      27-year-old male with no past medical history presents with a chief complaint of blood in his stool.  The patient reports he started with some bright red blood mixed with his stool this morning.  He reports that since that time he has had persistent blood from his rectum without any stool.  He denies any associated abdominal pain, nausea/vomiting, dysuria, diarrhea, constipation, hematuria, or melena.  There are no alleviating or aggravating factors.  The patient denies being on any blood thinners.      Review of patient's allergies indicates:  No Known Allergies  Past Medical History:   Diagnosis Date    Non-seasonal allergic rhinitis due to pollen      No past surgical history on file.  Family History   Problem Relation Age of Onset    Hyperlipidemia Mother     Heart disease Father     Diabetes Mellitus Maternal Grandmother     Breast cancer Maternal Grandmother     Skin cancer Maternal Grandmother      Social History     Tobacco Use    Smoking status: Never    Smokeless tobacco: Never   Substance Use Topics    Alcohol use: No    Drug use: No     Review of Systems   Constitutional:  Negative for chills, diaphoresis, fatigue and fever.   HENT:  Negative for congestion and rhinorrhea.    Respiratory:  Negative for cough and shortness of breath.    Cardiovascular:  Negative for chest pain.   Gastrointestinal:  Positive for blood in stool. Negative for abdominal distention, abdominal pain, constipation, diarrhea, nausea, rectal pain and vomiting.   Genitourinary:  Negative for dysuria, frequency and testicular pain.   Musculoskeletal:  Negative for gait problem.   Skin:  Negative for color change.   Neurological:  Negative for dizziness and numbness.   Psychiatric/Behavioral:  Negative for agitation and confusion.        Physical Exam     Initial Vitals [08/14/23 0853]   BP Pulse Resp Temp  SpO2   (!) 137/94 68 18 97.8 °F (36.6 °C) 95 %      MAP       --         Physical Exam    Nursing note and vitals reviewed.  Constitutional: He appears well-developed and well-nourished.   HENT:   Head: Normocephalic and atraumatic.   Eyes: EOM are normal. Pupils are equal, round, and reactive to light.   Neck: Neck supple.   Cardiovascular:  Normal rate and regular rhythm.           Pulmonary/Chest: Breath sounds normal.   Abdominal: Abdomen is soft. Bowel sounds are normal. He exhibits no distension. There is no abdominal tenderness. There is no guarding.   Genitourinary: Rectum:      Guaiac result negative.   Guaiac negative stool.    Genitourinary Comments: No stool noted in rectal vault.  No hemorrhoids, rectal fissure, or rectal masses noted.  Rectal exam done with nurse chaperone at bedside.     Musculoskeletal:         General: Normal range of motion.      Cervical back: Neck supple.     Neurological: He is alert and oriented to person, place, and time.   Skin: Skin is warm and dry.   Psychiatric: He has a normal mood and affect.         ED Course   Procedures  Labs Reviewed   COMPREHENSIVE METABOLIC PANEL - Abnormal; Notable for the following components:       Result Value    CO2 22 (*)     All other components within normal limits   CBC W/ AUTO DIFFERENTIAL - Abnormal; Notable for the following components:    RBC 4.49 (*)     MCH 31.8 (*)     All other components within normal limits   LIPASE          Imaging Results              CT Abdomen Pelvis With Contrast (Final result)  Result time 08/14/23 11:33:20      Final result by Hollis Rios MD (08/14/23 11:33:20)                   Impression:      1. No acute findings in the abdomen  2. Urinary bladder with wall thickening is presumably from inadequate distension.  Cystitis or outlet obstruction are possible depending on clinical setting.      Electronically signed by: Hollis Rios  Date:    08/14/2023  Time:    11:33               Narrative:     EXAMINATION:  CT ABDOMEN PELVIS WITH CONTRAST    CLINICAL HISTORY:  GI bleed;    TECHNIQUE:  Low dose axial images, sagittal and coronal reformations were obtained from the lung bases to the pubic symphysis following the IV administration of 100 mL of Omnipaque 350 .  Oral contrast was not given.    COMPARISON:  None.    FINDINGS:  Lung bases clear.  Normal size heart.  Decompressed gallbladder.    Solid abdominal organs including liver spleen pancreas adrenal glands and kidneys are unremarkable.    There is no enteric contrast which limits bowel assessment.  No dilated bowel loops.  Normal appendix.  No bowel wall thickening.  No discrete hyperdense collection within the bowel lumen.    No focal abdominal fluid collection.  Multiple normal sized mid abdominal mesenteric lymph nodes.  Normal size prostate.  Mild diffuse urinary bladder wall thickening.  Partially imaged right hydrocele.    No acute osseous abnormality.                                       Medications   iohexoL (OMNIPAQUE 350) 350 mg iodine/mL injection (100 mLs Intravenous Given 8/14/23 1121)     Medical Decision Making:   Initial Assessment:   27-year-old male presents with blood in his stool.  Differential Diagnosis:   Initial differential diagnosis included but not limited to proctitis, colitis, and enteritis.  Clinical Tests:   Lab Tests: Ordered and Reviewed  Radiological Study: Ordered and Reviewed  ED Management:  The patient was emergently evaluated in the emergency department, his evaluation was significant for a well-appearing young male with a benign abdominal exam.  The patient's labs showed no acute abnormalities.  The patient's CT scan of his abdomen and pelvis showed no acute processes per Radiology.  At this point in time I am unclear as to the etiology of his symptoms but he is stable for discharge to home.  The patient may have a viral enteritis/colitis causing his symptoms.  The patient is referred to GI for follow-up and further  workup as an outpatient.                          Clinical Impression:   Final diagnoses:  [K62.5] Rectal bleeding (Primary)        ED Disposition Condition    Discharge Stable          ED Prescriptions    None       Follow-up Information       Follow up With Specialties Details Why Contact Info    Brina Mendoza FNP Family Medicine Schedule an appointment as soon as possible for a visit   [address]  [address]  [place] [address]  [phone]      Olivre Aguilera MD Gastroenterology Schedule an appointment as soon as possible for a visit   [address]  [address]  [place] [address]  [phone]               Bobby Paz MD  08/14/23 1623       Bobby Paz MD  08/14/23 1739

## 2023-08-14 NOTE — ED NOTES
Assumed care:  Sky Barrientos III is awake, alert and oriented x 3, skin warm and dry, in NAD.  Patient states that he had 2 episodes this morning of bright red rectal bleeding with diarrhea.  Denies abd pain or N&V.    Patient identifiers for Sky Barrientos III checked and correct.  LOC:  Sky Barrientos III is awake, alert, and aware of environment with an appropriate affect. He is oriented x 3 and speaking appropriately.  APPEARANCE:  He is resting comfortably and in no acute distress. He is clean and well groomed, patient's clothing is properly fastened.  SKIN:  The skin is warm and dry. He has normal skin turgor and moist mucus membranes. Skin is intact; no bruising or breakdown noted.  MUSCULOSKELETAL:  He is moving all extremities well, no obvious deformities noted. Pulses intact.   RESPIRATORY:  Airway is open and patent. Respirations are spontaneous and non-labored with normal effort and rate.  CARDIAC:  He has a normal rate and rhythm. No peripheral edema noted. Capillary refill < 3 seconds.  ABDOMEN:  No distention noted.  Soft and non-tender upon palpation.  Rectal bleeding, diarrhea  NEUROLOGICAL:  PERRL. Facial expression is symmetrical. Hand grasps are equal bilaterally. Normal sensation in all extremities when touched with finger.  Allergies reported:  Review of patient's allergies indicates:  No Known Allergies

## 2023-08-14 NOTE — PATIENT INSTRUCTIONS
Emergency room precautions as discussed.  Stool softeners  Avoid sitting on commode for extended periods of time.

## 2023-08-14 NOTE — PROGRESS NOTES
"Subjective:      Patient ID: Sky Barrientos III is a 27 y.o. male.    Vitals:  height is 5' 10" (1.778 m) and weight is 86.2 kg (190 lb). His oral temperature is 97.3 °F (36.3 °C). His blood pressure is 123/78 and his pulse is 67. His respiration is 16 and oxygen saturation is 98%.     Chief Complaint: Rectal Bleeding    Mr. Barrientos, has a history of epilepsy, presents to clinic today with a chief complaint bright red rectal bleeding that started this morning.  Patient reports he woke up this morning had a bowel movement, and the end of the bowel movement noticed blood in toilet.  Patient went to work had another bowel movement that was watery and bloody.  Patient denies being on blood thinners, having any red dyes, straining, and using long-term NSAIDs.  Patient does endorse sitting on toilet for prolonged periods of time.  No family history of colon cancer, or polyps. Rectal exam unremarkable, patient is grossly hemoccult positive.    Rectal Bleeding  This is a new problem. The current episode started today. The problem has been rapidly worsening. Associated symptoms include abdominal pain (Cramping, tenesmus) and nausea. Pertinent negatives include no congestion, coughing, diaphoresis, fatigue, fever, neck pain, rash or sore throat. Associated symptoms comments: Dizziness  . Nothing aggravates the symptoms. He has tried nothing for the symptoms.       Constitution: Negative for sweating, fatigue and fever.   HENT:  Negative for congestion and sore throat.    Neck: Negative for neck pain and painful lymph nodes.   Cardiovascular:  Negative for leg swelling.   Eyes:  Negative for double vision and blurred vision.   Respiratory:  Negative for cough and shortness of breath.    Gastrointestinal:  Positive for abdominal pain (Cramping, tenesmus), nausea, diarrhea and bright red blood in stool. Negative for rectal pain.   Endocrine: cold intolerance and heat intolerance.   Genitourinary:  Negative for dysuria, frequency " and urgency.   Musculoskeletal:  Negative for pain.   Skin:  Negative for rash.   Allergic/Immunologic: Negative for environmental allergies, seasonal allergies and food allergies.   Neurological:  Negative for dizziness.   Hematologic/Lymphatic: Negative for swollen lymph nodes, easy bruising/bleeding and history of bleeding disorder. Does not bruise/bleed easily.   Psychiatric/Behavioral:  Positive for nervous/anxious. The patient is nervous/anxious.       Objective:     Physical Exam   Constitutional: He is oriented to person, place, and time. He appears well-developed. He is cooperative.  Non-toxic appearance. He does not appear ill. No distress. normal  HENT:   Head: Normocephalic and atraumatic.   Ears:   Right Ear: Hearing and external ear normal.   Left Ear: Hearing and external ear normal.   Nose: Nose normal. No mucosal edema or nasal deformity. No epistaxis. Right sinus exhibits no maxillary sinus tenderness and no frontal sinus tenderness. Left sinus exhibits no maxillary sinus tenderness and no frontal sinus tenderness.   Mouth/Throat: Uvula is midline, oropharynx is clear and moist and mucous membranes are normal. Mucous membranes are moist. No trismus in the jaw. Normal dentition. No uvula swelling. Oropharynx is clear.   Eyes: Conjunctivae and lids are normal. Pupils are equal, round, and reactive to light. Extraocular movement intact   Neck: Trachea normal and phonation normal. Neck supple.   Cardiovascular: Normal rate, regular rhythm, normal heart sounds and normal pulses.   Pulmonary/Chest: Effort normal and breath sounds normal.   Abdominal: Normal appearance. Soft. flat abdomen There is no abdominal tenderness. There is no left CVA tenderness and no right CVA tenderness.   Genitourinary: Rectum:      Guaiac result positive.      No anal fissure, external hemorrhoid or abnormal anal tone.     Musculoskeletal: Normal range of motion.         General: Normal range of motion.   Neurological: no  focal deficit. He is alert and oriented to person, place, and time. He exhibits normal muscle tone.   Skin: Skin is warm, dry, intact and not diaphoretic. Capillary refill takes 2 to 3 seconds.   Psychiatric: His speech is normal and behavior is normal. Judgment and thought content normal.   Nursing note and vitals reviewed.      Assessment:     1. Rectal bleed        Plan:       Rectal bleed      Hemoccult positive.  Emergency room precautions discussed with patient.

## 2024-07-09 ENCOUNTER — PATIENT MESSAGE (OUTPATIENT)
Dept: ADMINISTRATIVE | Facility: HOSPITAL | Age: 29
End: 2024-07-09
Payer: COMMERCIAL

## 2025-08-20 ENCOUNTER — PATIENT MESSAGE (OUTPATIENT)
Dept: ADMINISTRATIVE | Facility: HOSPITAL | Age: 30
End: 2025-08-20
Payer: COMMERCIAL